# Patient Record
Sex: FEMALE | Race: BLACK OR AFRICAN AMERICAN | NOT HISPANIC OR LATINO | Employment: FULL TIME | RURAL
[De-identification: names, ages, dates, MRNs, and addresses within clinical notes are randomized per-mention and may not be internally consistent; named-entity substitution may affect disease eponyms.]

---

## 2024-01-23 ENCOUNTER — OFFICE VISIT (OUTPATIENT)
Dept: OBSTETRICS AND GYNECOLOGY | Facility: CLINIC | Age: 24
End: 2024-01-23
Payer: COMMERCIAL

## 2024-01-23 VITALS
HEIGHT: 64 IN | SYSTOLIC BLOOD PRESSURE: 126 MMHG | WEIGHT: 293 LBS | BODY MASS INDEX: 50.02 KG/M2 | DIASTOLIC BLOOD PRESSURE: 74 MMHG

## 2024-01-23 DIAGNOSIS — Z12.4 SCREENING FOR MALIGNANT NEOPLASM OF THE CERVIX: Primary | ICD-10-CM

## 2024-01-23 DIAGNOSIS — N93.8 DYSFUNCTIONAL UTERINE BLEEDING: ICD-10-CM

## 2024-01-23 PROCEDURE — 3008F BODY MASS INDEX DOCD: CPT | Mod: ,,, | Performed by: OBSTETRICS & GYNECOLOGY

## 2024-01-23 PROCEDURE — 3078F DIAST BP <80 MM HG: CPT | Mod: ,,, | Performed by: OBSTETRICS & GYNECOLOGY

## 2024-01-23 PROCEDURE — 3074F SYST BP LT 130 MM HG: CPT | Mod: ,,, | Performed by: OBSTETRICS & GYNECOLOGY

## 2024-01-23 PROCEDURE — 88142 CYTOPATH C/V THIN LAYER: CPT | Mod: TC,GCY | Performed by: OBSTETRICS & GYNECOLOGY

## 2024-01-23 PROCEDURE — 99203 OFFICE O/P NEW LOW 30 MIN: CPT | Mod: S$PBB,,, | Performed by: OBSTETRICS & GYNECOLOGY

## 2024-01-23 PROCEDURE — 1159F MED LIST DOCD IN RCRD: CPT | Mod: ,,, | Performed by: OBSTETRICS & GYNECOLOGY

## 2024-01-23 PROCEDURE — 99203 OFFICE O/P NEW LOW 30 MIN: CPT | Mod: PBBFAC | Performed by: OBSTETRICS & GYNECOLOGY

## 2024-01-23 RX ORDER — LOSARTAN POTASSIUM AND HYDROCHLOROTHIAZIDE 25; 100 MG/1; MG/1
1 TABLET ORAL DAILY
COMMUNITY
Start: 2023-09-12

## 2024-01-23 NOTE — PATIENT INSTRUCTIONS
Discussed checking CBC hCG TSH prolactin levels.    We have discussed the alternatives of oral contraceptives versus Mirena IUD.  We will discuss this later.  After review of lab.      In addition I will order a vaginal probe ultrasound.

## 2024-01-23 NOTE — PROGRESS NOTES
Subjective:       Patient ID: Tonya Salinas is a 24 y.o. female.    Chief Complaint: Dysfunctional Uterine Bleeding (Pt presents with hx of irregular,heavy menses, sometimes lasting a month off/on. Needs check up and pap)      Presents new to my practice.  She presents with irregular menses.  Long history of irregular menses.  Previously she has had Nexplanon inserted.  However this was removed in 2022.  She is presently using only condoms for contraception.  Bleeds intermittently throughout the month occasionally heavy.      She is presently working with primary care provider regarding weight management.  Weight at present 402 Strongly encouraged continuing management with primary care provider.  She will further discuss with primary care provider regarding the possibility of gastric surgery in the future if weight loss does not occur.    Review of Systems      Objective:      Physical Exam  Abdominal:      Comments:   Abdomen soft nondistended nontender   Genitourinary:     Comments:  External normal vault normal cervix normal to appearance grossly Pap taken bimanual exam revealed uterus thought to be normal size exam difficult.  No obvious adnexal masses noted.      Rectovaginal  exam attempted but unsuccessful.            Assessment:       1. Screening for malignant neoplasm of the cervix    2. Dysfunctional uterine bleeding        Plan:       Patient Instructions   Discussed checking CBC hCG TSH prolactin levels.    We have discussed the alternatives of oral contraceptives versus Mirena IUD.  We will discuss this later.  After review of lab.      In addition I will order a vaginal probe ultrasound.

## 2024-01-24 ENCOUNTER — TELEPHONE (OUTPATIENT)
Dept: OBSTETRICS AND GYNECOLOGY | Facility: CLINIC | Age: 24
End: 2024-01-24
Payer: COMMERCIAL

## 2024-01-24 NOTE — TELEPHONE ENCOUNTER
----- Message from Ana Munoz sent at 1/24/2024 12:19 PM CST -----  Pt calling to see if anyway she can get later appt for us on 1/29 (needing latest appt if possible  - or pt states on 2/12 or 2/19 it could be the earliest appt- call back # [phone]

## 2024-01-24 NOTE — TELEPHONE ENCOUNTER
Pt called requesting to change appt date. Pt notified ultrasound appt was changed to 2/19/2024 at 3:30 and she will see Dr. Aldrich when done. Pt verbalizes understanding.

## 2024-01-24 NOTE — TELEPHONE ENCOUNTER
----- Message from Ana Munoz sent at 1/24/2024 12:19 PM CST -----  Pt calling to see if anyway she can get later appt for us on 1/29 (needing latest appt if possible  - or pt states on 2/12 or 2/19 it could be the earliest appt- call back # [phone]

## 2024-01-25 LAB
GH SERPL-MCNC: NORMAL NG/ML
INSULIN SERPL-ACNC: NORMAL U[IU]/ML
LAB AP CLINICAL INFORMATION: NORMAL
LAB AP GYN INTERPRETATION: NEGATIVE
LAB AP PAP DISCLAIMER COMMENTS: NORMAL
RENIN PLAS-CCNC: NORMAL NG/ML/H

## 2024-02-19 ENCOUNTER — OFFICE VISIT (OUTPATIENT)
Dept: OBSTETRICS AND GYNECOLOGY | Facility: CLINIC | Age: 24
End: 2024-02-19
Payer: COMMERCIAL

## 2024-02-19 ENCOUNTER — HOSPITAL ENCOUNTER (OUTPATIENT)
Dept: RADIOLOGY | Facility: HOSPITAL | Age: 24
Discharge: HOME | End: 2024-02-19
Attending: OBSTETRICS & GYNECOLOGY
Payer: COMMERCIAL

## 2024-02-19 VITALS — SYSTOLIC BLOOD PRESSURE: 138 MMHG | DIASTOLIC BLOOD PRESSURE: 82 MMHG

## 2024-02-19 DIAGNOSIS — N93.8 DYSFUNCTIONAL UTERINE BLEEDING: ICD-10-CM

## 2024-02-19 DIAGNOSIS — Z30.9 ENCOUNTER FOR CONTRACEPTIVE MANAGEMENT, UNSPECIFIED TYPE: Primary | ICD-10-CM

## 2024-02-19 PROCEDURE — 76856 US EXAM PELVIC COMPLETE: CPT | Mod: 26,,, | Performed by: RADIOLOGY

## 2024-02-19 PROCEDURE — 3079F DIAST BP 80-89 MM HG: CPT | Mod: ,,, | Performed by: OBSTETRICS & GYNECOLOGY

## 2024-02-19 PROCEDURE — 3075F SYST BP GE 130 - 139MM HG: CPT | Mod: ,,, | Performed by: OBSTETRICS & GYNECOLOGY

## 2024-02-19 PROCEDURE — 76856 US EXAM PELVIC COMPLETE: CPT | Mod: TC

## 2024-02-19 PROCEDURE — 99212 OFFICE O/P EST SF 10 MIN: CPT | Mod: S$PBB,,, | Performed by: OBSTETRICS & GYNECOLOGY

## 2024-02-19 PROCEDURE — 99213 OFFICE O/P EST LOW 20 MIN: CPT | Mod: PBBFAC,25 | Performed by: OBSTETRICS & GYNECOLOGY

## 2024-02-19 RX ORDER — NORETHINDRONE 0.35 MG/1
1 TABLET ORAL DAILY
Qty: 30 TABLET | Refills: 11 | Status: SHIPPED | OUTPATIENT
Start: 2024-02-19 | End: 2025-02-18

## 2024-02-19 NOTE — PATIENT INSTRUCTIONS
Discussed previous prolactin level,, TSH levels normal.      HCG was negative.      CBC revealed minimal anemia hematocrit 35%.  White count and platelets normal.    Today we had further discussion.  She does not desire pregnancy at present.  She has been on Depo-Provera and Nexplanon in the past.  However she is off contraception using condoms only at present.      Her major complaint is that of erratic menses and heavy menses.    I have discussed beginning progestin only birth control pills.     I have discussed the slight decrease efficacy at her weight.  Therefore I have suggested and recommended condoms in addition to oral contraceptives.    The risk of blood clots and even stroke with oral contraceptives discussed.      Her blood pressure today was 138/82.    Will follow up in 3 months.

## 2024-02-19 NOTE — PROGRESS NOTES
Subjective:       Patient ID: Tonya Salinas is a 24 y.o. female.    Chief Complaint: Follow-up (Here for 3 week f/u with pelvic ultrasound done today. (See previous note))    Presents for follow-up as directed.      Previously seen with dysfunctional uterine bleeding thought to be ovulatory in origin.      A follow-up ultrasound was done today.  This revealed uterus measuring 8.5 x 3.4 x 4.1 cm.  Endometrial echo was 13.3 mm.    Right ovary 3.0 x 2.0 x 2.8 cm small 1.2 x 0.75 x 0.89 cm cyst noted.    Left ovary 3.0 x 1.6 x 2.6 cm.              Review of Systems      Objective:      Physical Exam    Assessment:       No diagnosis found.    Plan:       There are no Patient Instructions on file for this visit.